# Patient Record
Sex: FEMALE | Race: WHITE | Employment: UNEMPLOYED | URBAN - METROPOLITAN AREA
[De-identification: names, ages, dates, MRNs, and addresses within clinical notes are randomized per-mention and may not be internally consistent; named-entity substitution may affect disease eponyms.]

---

## 2017-10-19 ENCOUNTER — OFFICE VISIT (OUTPATIENT)
Dept: NEUROLOGY | Age: 42
End: 2017-10-19

## 2017-10-19 VITALS — HEART RATE: 72 BPM | OXYGEN SATURATION: 98 % | SYSTOLIC BLOOD PRESSURE: 140 MMHG | DIASTOLIC BLOOD PRESSURE: 100 MMHG

## 2017-10-19 DIAGNOSIS — R20.0 NUMBNESS IN FEET: ICD-10-CM

## 2017-10-19 DIAGNOSIS — R20.0 NUMBNESS IN BOTH HANDS: Primary | ICD-10-CM

## 2017-10-19 DIAGNOSIS — G43.009 MIGRAINE WITHOUT AURA AND WITHOUT STATUS MIGRAINOSUS, NOT INTRACTABLE: ICD-10-CM

## 2017-10-19 RX ORDER — ONDANSETRON HYDROCHLORIDE 8 MG/1
TABLET, FILM COATED ORAL
Refills: 3 | COMMUNITY
Start: 2017-10-08

## 2017-10-19 RX ORDER — CLONAZEPAM 0.5 MG/1
TABLET ORAL
Refills: 3 | COMMUNITY
Start: 2017-10-05

## 2017-10-19 RX ORDER — TIZANIDINE 4 MG/1
TABLET ORAL
Refills: 1 | COMMUNITY
Start: 2017-10-09

## 2017-10-19 RX ORDER — ELETRIPTAN HYDROBROMIDE 40 MG/1
TABLET, FILM COATED ORAL
Refills: 3 | COMMUNITY
Start: 2017-10-13

## 2017-10-19 RX ORDER — METHOTREXATE 2.5 MG/1
TABLET ORAL
Refills: 0 | COMMUNITY
Start: 2017-07-16

## 2017-10-19 RX ORDER — RIZATRIPTAN BENZOATE 10 MG/1
TABLET ORAL
Refills: 6 | COMMUNITY
Start: 2017-09-30

## 2017-10-19 RX ORDER — PAROXETINE 30 MG/1
TABLET, FILM COATED ORAL
Refills: 3 | COMMUNITY
Start: 2017-09-20

## 2017-10-19 RX ORDER — TOPIRAMATE 50 MG/1
TABLET, FILM COATED ORAL
Refills: 6 | COMMUNITY
Start: 2017-09-29

## 2017-10-19 NOTE — LETTER
Dear Vale Lambert MD, Thank you for allowing me to see your patient, Abdirahman Ashby for a neurological consultation. Please see my impression and recommendations as outlined in my note. Sincerely, Parmjit Ventura MD 
[facility] at [address] [facility] BY: 
Vale Lambert MD 
 
CHIEF COMPLAINT: 
Migraines, numbness, tingling HISTORY OF PRESENT ILLNESS HISTORY PROVIDED BY: 
Patient Abdirahman sAhby is a 40 y.o. female who I am asked to see in consultation for migraines, numbness, tingling. Patient reports that she started with migraines many years ago. They will come and go but now become much more consistent and intense. Starting in the spring she developed numbness and tingling in the hands and feet bilaterally. There is also a burning sensation. Initially this was also intermittent but has now become almost constant. She has had neuropathy labs that have been normal. 
She did start a new medication for her rheumatoid arthritis called Symponi which she started in the spring. She does not recall having these symptoms prior to that. Patient reports that since she has been waiting for this appointment some of her sensation has improved. Now she will does have intermittent burning pain. Almost like her pants feet are asleep need to wake up. She did try a low dose of Lyrica but it caused her to have weight gain so she went off of this. She reports that she was started on Topamax for her headaches and since then has had no appetite and some nausea so she has lost weight. Patient was overall her energy is very low. In terms of migraine patient reports they are mostly left-sided. She has associated nausea and photo/phonophobia. She has had a few migraines per week. She takes Zofran and Relpax or Maxalt for them. She does feel like the Topamax is helping those decrease in frequency. She also has a sensation of pruritus all over. She also has a slight rash on her back. She has not had any neuroimaging. She did have a cervical spine x-ray that showed some degenerative changes. Patient has had a hysterectomy Patient has no family history of any neurological problems that she is aware. Patient reports her thyroid is within normal limits. Patient reports that her previous abortive Imitrex was a very bad for her and gave her terrible side effects. Patient denies any vision changes. Patient does have some issues with balance especially over the summer but currently that is stable. She is on vitamin supplements. [facility] Past Medical History:  
Diagnosis Date  Headache  Rheumatoid arthritis ([facility] [address] Social History Social History  Marital status:  Spouse name: N/A  
 Number of children: N/A  
 Years of education: N/A Social History Main Topics  Smoking status: Not on file  Smokeless tobacco: Not on file  Alcohol use Not on file  Drug use: Not on file  Sexual activity: Not on file Other Topics Concern  Not on file Social History Narrative [facility] No significant family history ALLERGIES No Known Allergies CURRENT MEDS Sympo methotrexate Tizanidine Clonazepam 
Paroxetine Topiramate 100 mg twice daily Maxalt Relpax Zofran REVIEW OF SYSTEMS:  
 
Y  N       Y  N  Y  N   Y  N 
  AIDS            Falls    Memory Loss     Shortness of breath Anxiety            Fatigue   Muscle Pain           Skipped beats Chest Pain     Frequent HA   Ms Weakness        Snoring Constipation  Hearing loss   Nause/Vomiting     Stomach Pain Cough           Hepatitis   Neuropathy            Swallowing difficulty Depression   Incontinence   Poor appetite         Vertigo Diarrhea         Joint Pain   Rash                      Visual disturbances Fainting          Leg Swelling   Ringing ears          Weight changes Unable to obtain  ROS due to  mental status change  sedated   intubated PREVIOUS WORKUP IMAGING: None LABS Results for orders placed or performed during the hospital encounter of 05/11/15 HCG URINE, QL. - POC Result Value Ref Range Pregnancy test,urine (POC) NEGATIVE  NEG    
TSH within normal limits PHYSICAL EXAM 
Visit Vitals  BP (!) 140/100  Pulse 72  SpO2 98% General:  Alert, cooperative, no distress. Head:  Normocephalic, without obvious abnormality, atraumatic. Eyes:  Conjunctivae/corneas clear. Pupils equal, round, reactive to light. Extraocular movements intact, VFF, NO papilledema Lungs: 
Heart:   Non labored breathing Regular rate and rhythm, no carotid bruits Abdomen:   Soft, non-distended Extremities: Extremities normal, atraumatic, no cyanosis or edema. Pulses: 2+ and symmetric all extremities. Skin: Skin color, texture, turgor normal. No rashes or lesions. Neurologic:  Gen: Attention normal 
           Language: naming, repetition, fluency normal 
           Memory: intact recent and remote memory Cranial Nerves: 
I: smell Not tested II: visual fields Full to confrontation II: pupils Equal, round, reactive to light II: optic disc No papilledema III,VII: ptosis none III,IV,VI: extraocular muscles  Full ROM V: mastication normal  
V: facial light touch sensation  normal  
VII: facial muscle function   symmetric VIII: hearing symmetric IX: soft palate elevation  normal  
XI: trapezius strength  5/5 XI: sternocleidomastoid strength 5/5 XI: neck flexion strength  5/5 XII: tongue  midline Motor: normal bulk and tone, no tremor Strength: 5/5 all four extremities Sensory: Decreased pinprick in a stocking glove distribution Coordination: FTN intact, Rhomberg negative Gait: normal gait including tandem Reflexes: 2+ throughout IMPRESSION 
 Merline Ponds is a 40 y.o. female who presents for evaluation of numbness and tingling in the hands and feet. I am concerned she has developed neuropathy. The question is that this may be related to her new medication for rheumatoid arthritis. Will do EMG/NCS to evaluate. Also discussed migraines in detail. Will continue Topamax for now but may need to consider different preventative in the future. RECOMMENDATIONS 1.  EMG/NCS of right arm and leg 2. Will get record of patient's previous labs and or any other neuropathy labs that need to be done 3. Continue vitamin supplements 4. Migraine book given 5. Preventative with Topamax 100 mg twice daily 6. Abortive with Relpax/Maxalt and Zofran 7. Discussed other neuropathic pain meds such as gabapentin if needed. Patient will wait on this for now 8. May need further neuro imaging if above workup is negative FU EMG/NCS Sabino Cabrales MD 
 
CC: Dior Rodriguez MD 
Fax: [phone] This note was created using voice recognition software. Despite editing, there may be syntax errors. This note will not be viewable in [address]

## 2017-10-19 NOTE — PROGRESS NOTES
NEUROLOGY NEW PATIENT CONSULTATION    REFERRED BY:  Vale Lambert MD    CHIEF COMPLAINT:  Migraines, numbness, tingling    HISTORY OF PRESENT ILLNESS    HISTORY PROVIDED BY:  Patient      Abdirahman Ashby is a 40 y.o. female who I am asked to see in consultation for migraines, numbness, tingling. Patient reports that she started with migraines many years ago. They will come and go but now become much more consistent and intense. Starting in the spring she developed numbness and tingling in the hands and feet bilaterally. There is also a burning sensation. Initially this was also intermittent but has now become almost constant. She has had neuropathy labs that have been normal.  She did start a new medication for her rheumatoid arthritis called Symponi which she started in the spring. She does not recall having these symptoms prior to that. Patient reports that since she has been waiting for this appointment some of her sensation has improved. Now she will does have intermittent burning pain. Almost like her pants feet are asleep need to wake up. She did try a low dose of Lyrica but it caused her to have weight gain so she went off of this. She reports that she was started on Topamax for her headaches and since then has had no appetite and some nausea so she has lost weight. Patient was overall her energy is very low. In terms of migraine patient reports they are mostly left-sided. She has associated nausea and photo/phonophobia. She has had a few migraines per week. She takes Zofran and Relpax or Maxalt for them. She does feel like the Topamax is helping those decrease in frequency. She also has a sensation of pruritus all over. She also has a slight rash on her back. She has not had any neuroimaging. She did have a cervical spine x-ray that showed some degenerative changes. Patient has had a hysterectomy  Patient has no family history of any neurological problems that she is aware.   Patient reports her thyroid is within normal limits. Patient reports that her previous abortive Imitrex was a very bad for her and gave her terrible side effects. Patient denies any vision changes. Patient does have some issues with balance especially over the summer but currently that is stable. She is on vitamin supplements.         PMH  Past Medical History:   Diagnosis Date    Headache     Rheumatoid arthritis ([address]        SH  Social History     Social History    Marital status:      Spouse name: N/A    Number of children: N/A    Years of education: N/A     Social History Main Topics    Smoking status: Not on file    Smokeless tobacco: Not on file    Alcohol use Not on file    Drug use: Not on file    Sexual activity: Not on file     Other Topics Concern    Not on file     Social History Narrative       FH  No significant family history    ALLERGIES  No Known Allergies    CURRENT MEDS  Sympo methotrexate  Tizanidine  Clonazepam  Paroxetine  Topiramate 100 mg twice daily  Maxalt  Relpax  Zofran      REVIEW OF SYSTEMS:     Y  N       Y  N  Y  N   Y  N  [] [x] AIDS          [x] [] Falls  [] [x] Memory Loss  [] [x]  Shortness of breath  [x] [] Anxiety          [x] [] Fatigue [x] [] Muscle Pain        [] [x]  Skipped beats  [] [x] Chest Pain   [x] [] Frequent HA [x] [] Ms Weakness     [] [x]  Snoring  [x] [] Constipation [] [x]Hearing loss [x] [] Nause/Vomiting  [x] []  Stomach Pain  [] [x] Cough          [] [x]Hepatitis [x] [] Neuropathy         [] [x]  Swallowing difficulty  [] [x] Depression  [x] []Incontinence [x] [] Poor appetite      [] [x]  Vertigo  [x] [] Diarrhea       [x] [] Joint Pain [x] [] Rash                   [] [x]  Visual disturbances  [] [x] Fainting        [x] [] Leg Swelling [] [x] Ringing ears       [x] []  Weight changes      []Unable to obtain  ROS due to  []mental status change  []sedated   []intubated          PREVIOUS WORKUP  IMAGING: None      LABS  Results for orders placed or performed during the hospital encounter of 05/11/15   HCG URINE, QL. - POC   Result Value Ref Range    Pregnancy test,urine (POC) NEGATIVE  NEG     TSH within normal limits    PHYSICAL EXAM  Visit Vitals    BP (!) 140/100    Pulse 72    SpO2 98%     General:  Alert, cooperative, no distress. Head:  Normocephalic, without obvious abnormality, atraumatic. Eyes:  Conjunctivae/corneas clear. Pupils equal, round, reactive to light. Extraocular movements intact, VFF, NO papilledema   Lungs:  Heart:   Non labored breathing  Regular rate and rhythm, no carotid bruits   Abdomen:   Soft, non-distended   Extremities: Extremities normal, atraumatic, no cyanosis or edema. Pulses: 2+ and symmetric all extremities. Skin: Skin color, texture, turgor normal. No rashes or lesions. Neurologic:  Gen: Attention normal             Language: naming, repetition, fluency normal             Memory: intact recent and remote memory  Cranial Nerves:  I: smell Not tested   II: visual fields Full to confrontation   II: pupils Equal, round, reactive to light   II: optic disc No papilledema   III,VII: ptosis none   III,IV,VI: extraocular muscles  Full ROM   V: mastication normal   V: facial light touch sensation  normal   VII: facial muscle function   symmetric   VIII: hearing symmetric   IX: soft palate elevation  normal   XI: trapezius strength  5/5   XI: sternocleidomastoid strength 5/5   XI: neck flexion strength  5/5   XII: tongue  midline     Motor: normal bulk and tone, no tremor              Strength: 5/5 all four extremities  Sensory: Decreased pinprick in a stocking glove distribution  Coordination: FTN intact, Rhomberg negative  Gait: normal gait including tandem   Reflexes: 2+ throughout       IMPRESSION  Donna Paul is a 40 y.o. female who presents for evaluation of numbness and tingling in the hands and feet. I am concerned she has developed neuropathy.   The question is that this may be related to her new medication for rheumatoid arthritis. Will do EMG/NCS to evaluate. Also discussed migraines in detail. Will continue Topamax for now but may need to consider different preventative in the future. RECOMMENDATIONS  1.  EMG/NCS of right arm and leg  2. Will get record of patient's previous labs and or any other neuropathy labs that need to be done  3. Continue vitamin supplements  4. Migraine book given  5. Preventative with Topamax 100 mg twice daily  6. Abortive with Relpax/Maxalt and Zofran  7. Discussed other neuropathic pain meds such as gabapentin if needed. Patient will wait on this for now  8. May need further neuro imaging if above workup is negative    FU EMG/NCS  Nora Carreon MD    CC: Robert Jaramillo MD  Fax: [phone]    This note was created using voice recognition software. Despite editing, there may be syntax errors. This note will not be viewable in [address]

## 2017-10-19 NOTE — MR AVS SNAPSHOT
Visit Information Date & Time Provider Department Dept. Phone Encounter #  
 10/19/2017 10:00 AM Ashkan Landry MD [facility] Neurology [facility] [phone] [id number] Upcoming Health Maintenance Date Due DTaP/Tdap/Td series (1 - Tdap) 1/9/1997 PAP AKA CERVICAL CYTOLOGY 1/9/1997 INFLUENZA AGE 9 TO ADULT 8/1/2017 Allergies as of 10/19/2017  Review Complete On: 10/19/2017 By: Michoacano Thakkar No Known Allergies Current Immunizations  Never Reviewed No immunizations on file. Not reviewed this visit You Were Diagnosed With   
  
 Codes Comments Numbness in both hands    -  Primary ICD-10-CM: R20.0 ICD-9-CM: 782.0 Numbness in feet     ICD-10-CM: R20.0 ICD-9-CM: 973. 0 Vitals OB Status Smoking Status Having regular periods Never Assessed Your Updated Medication List  
  
   
This list is accurate as of: 10/19/17 11:05 AM.  Always use your most recent med list.  
  
  
  
  
 clonazePAM 0.5 mg tablet Commonly known as:  Sosa Aw TK 1 TO 2 T PO QHS UTD  
  
 eletriptan 40 mg tablet Commonly known as:  RELPAX TAKE 1 TABLET BY MOUTH AT THE ONSET OF HEADACHE, MAY REPEAT WITH 1 TAB IN 2 HOURS IF NEEDED  
  
 methotrexate 2.5 mg tablet Commonly known as:  RHEUMATREX  
TAKE 10 TABLETS BY MOUTH ONCE PER WEEK  
  
 ondansetron hcl 8 mg tablet Commonly known as:  ZOFRAN  
TAKE 1 TABLET BY MOUTH EVERY 8 HOURS AS NEEDED FOR NAUSEA PARoxetine 30 mg tablet Commonly known as:  PAXIL TAKE 1 TABLET BY MOUTH EVERY MORNING  
  
 rizatriptan 10 mg tablet Commonly known as:  Magali Rast TAKE 1 TABLET BY MOUTH AS NEEDED FOR MIGRAINE, MAY REPEAT DOSE ONCE WITHIN 24 HOURS IF NEEDED  
  
 tiZANidine 4 mg tablet Commonly known as:  Mindi Fulling TAKE 1 TABLET AT BEDTIME (ALTERNATE WITH CYCLOBENZAPRINE)  
  
 topiramate 50 mg tablet Commonly known as:  TOPAMAX TAKE 1-2 TABLET TWICE A DAY To-Do List   
 10/20/2017 Neurology:  [facility] Introducing [facility]! [facility] introduces [employer] patient portal. Now you can access parts of your medical record, email your doctor's office, and request medication refills online. 1. In your internet browser, go to [email or web address] 2. Click on the First Time User? Click Here link in the Sign In box. You will see the New Member Sign Up page. 3. Enter your M[employer]oplex Access Code exactly as it appears below. You will not need to use this code after youve completed the sign-up process. If you do not sign up before the expiration date, you must request a new code. · M[employer]oplex Access Code: [email or web address] Expires: 1/17/2018 10:00 AM 
 
4. Enter the last four digits of your Social Security Number (xxxx) and Date of Birth (mm/dd/yyyy) as indicated and click Submit. You will be taken to the next sign-up page. 5. Create a M[employer]oplex ID. This will be your M[employer]oplex login ID and cannot be changed, so think of one that is secure and easy to remember. 6. Create a M[employer]x password. You can change your password at any time. 7. Enter your Password Reset Question and Answer. This can be used at a later time if you forget your password. 8. Enter your e-mail address. You will receive e-mail notification when new information is available in [address] 9. Click Sign Up. You can now view and download portions of your medical record. 10. Click the Download Summary menu link to download a portable copy of your medical information. If you have questions, please visit the Frequently Asked Questions section of the M[employer] website. Remember, M[employer]oplex is NOT to be used for urgent needs. For medical emergencies, dial 911. Now available from your iPhone and Android! Please provide this summary of care documentation to your next provider. Your primary care clinician is listed as Kathy Ward.  If you have any questions after today's visit, please call [phone].

## 2017-10-24 ENCOUNTER — TELEPHONE (OUTPATIENT)
Dept: NEUROLOGY | Age: 42
End: 2017-10-24

## 2017-10-26 ENCOUNTER — OFFICE VISIT (OUTPATIENT)
Dept: NEUROLOGY | Age: 42
End: 2017-10-26

## 2017-10-26 ENCOUNTER — TELEPHONE (OUTPATIENT)
Dept: NEUROLOGY | Age: 42
End: 2017-10-26

## 2017-10-26 DIAGNOSIS — R20.2 PARESTHESIA: Primary | ICD-10-CM

## 2017-10-26 NOTE — TELEPHONE ENCOUNTER
Contacted this physicians office and informed them we do not have a release nor a referral that I can see in the patients chart regarding this patient. I informed her that I would contact patient to request she sign a release, the lady said she would just fax the release herself. I informed her I cannot provide patients name and she states she already knows who it is and will send the fax. I informed her if she is confident she has the correct patient she may fax the release. Fax number provided.

## 2017-10-26 NOTE — TELEPHONE ENCOUNTER
[facility] needs last notes fax [phone]

## 2017-11-01 ENCOUNTER — OFFICE VISIT (OUTPATIENT)
Dept: NEUROLOGY | Age: 42
End: 2017-11-01

## 2017-11-01 VITALS — HEART RATE: 78 BPM | SYSTOLIC BLOOD PRESSURE: 153 MMHG | DIASTOLIC BLOOD PRESSURE: 92 MMHG

## 2017-11-01 DIAGNOSIS — R20.0 NUMBNESS IN FEET: ICD-10-CM

## 2017-11-01 DIAGNOSIS — G43.009 MIGRAINE WITHOUT AURA AND WITHOUT STATUS MIGRAINOSUS, NOT INTRACTABLE: ICD-10-CM

## 2017-11-01 DIAGNOSIS — R20.0 NUMBNESS IN BOTH HANDS: Primary | ICD-10-CM

## 2017-11-01 RX ORDER — GABAPENTIN 100 MG/1
300 CAPSULE ORAL
Qty: 90 CAP | Refills: 5 | Status: SHIPPED | OUTPATIENT
Start: 2017-11-01 | End: 2018-06-04 | Stop reason: SDUPTHER

## 2017-11-01 NOTE — MR AVS SNAPSHOT
Visit Information Date & Time Provider Department Dept. Phone Encounter #  
 11/1/2017  9:40 AM Sebastian Gary MD [facility] Neurology [facility] [phone] [id number] Upcoming Health Maintenance Date Due DTaP/Tdap/Td series (1 - Tdap) 1/9/1997 PAP AKA CERVICAL CYTOLOGY 1/9/1997 INFLUENZA AGE 9 TO ADULT 8/1/2017 Allergies as of 11/1/2017  Review Complete On: 11/1/2017 By: Sebastian Gary MD  
 No Known Allergies Current Immunizations  Never Reviewed No immunizations on file. Not reviewed this visit You Were Diagnosed With   
  
 Codes Comments Numbness in both hands    -  Primary ICD-10-CM: R20.0 ICD-9-CM: 782.0 Numbness in feet     ICD-10-CM: R20.0 ICD-9-CM: 782.0 Migraine without aura and without status migrainosus, not intractable     ICD-10-CM: M39.066 ICD-9-CM: 346.10 Vitals BP Pulse OB Status Smoking Status (!) 153/92 78 Having regular periods Never Smoker Preferred Pharmacy Pharmacy Name Phone [id number] Shelby Iglesias [address] [place] [phone] Your Updated Medication List  
  
   
This list is accurate as of: 11/1/17 10:12 AM.  Always use your most recent med list.  
  
  
  
  
 clonazePAM 0.5 mg tablet Commonly known as:  Alexandra Greene TK 1 TO 2 T PO QHS UTD  
  
 eletriptan 40 mg tablet Commonly known as:  RELPAX TAKE 1 TABLET BY MOUTH AT THE ONSET OF HEADACHE, MAY REPEAT WITH 1 TAB IN 2 HOURS IF NEEDED  
  
 gabapentin 100 mg capsule Commonly known as:  NEURONTIN Take 3 Caps by mouth nightly. methotrexate 2.5 mg tablet Commonly known as:  RHEUMATREX  
TAKE 10 TABLETS BY MOUTH ONCE PER WEEK  
  
 ondansetron hcl 8 mg tablet Commonly known as:  ZOFRAN  
TAKE 1 TABLET BY MOUTH EVERY 8 HOURS AS NEEDED FOR NAUSEA PARoxetine 30 mg tablet Commonly known as:  PAXIL TAKE 1 TABLET BY MOUTH EVERY MORNING  
  
 rizatriptan 10 mg tablet Commonly known as:  Andrzej Pierre TAKE 1 TABLET BY MOUTH AS NEEDED FOR MIGRAINE, MAY REPEAT DOSE ONCE WITHIN 24 HOURS IF NEEDED  
  
 tiZANidine 4 mg tablet Commonly known as:  Latasha Reagin TAKE 1 TABLET AT BEDTIME (ALTERNATE WITH CYCLOBENZAPRINE) * topiramate 50 mg tablet Commonly known as:  TOPAMAX TAKE 1-2 TABLET TWICE A DAY * topiramate  mg capsule Commonly known as:  TROKENDI XR Take 1 Cap by mouth daily. * Notice: This list has 2 medication(s) that are the same as other medications prescribed for you. Read the directions carefully, and ask your doctor or other care provider to review them with you. Prescriptions Sent to Pharmacy Refills  
 gabapentin (NEURONTIN) 100 mg capsule 5 Sig: Take 3 Caps by mouth nightly. Class: Normal  
 Pharmacy: [facility] [address] [place] [address] #: [phone] Route: Oral  
  
Patient Instructions Benjamin Giordano [id number] What is a living will? A living will is a legal form you use to write down the kind of care you want at the end of your life. It is used by the health professionals who will treat you if you aren't able to decide for yourself. If you put your wishes in writing, your loved ones and others will know what kind of care you want. They won't need to guess. This can ease your mind and be helpful to others. A living will is not the same as an estate or property will. An estate will explains what you want to happen with your money and property after you die. Is a living will a legal document? A living will is a legal document. Each state has its own laws about living giles. If you move to another state, make sure that your living will is legal in the state where you now live. Or you might use a universal form that has been approved by many states. This kind of form can sometimes be completed and stored online.  Your electronic copy will then be available wherever you have a connection to the Internet. In most cases, doctors will respect your wishes even if you have a form from a different state. · You don't need an  to complete a living will. But legal advice can be helpful if your state's laws are unclear, your health history is complicated, or your family can't agree on what should be in your living will. · You can change your living will at any time. Some people find that their wishes about end-of-life care change as their health changes. · In addition to making a living will, think about completing a medical power of  form. This form lets you name the person you want to make end-of-life treatment decisions for you (your \"health care agent\") if you're not able to. Many hospitals and nursing homes will give you the forms you need to complete a living will and a medical power of . · Your living will is used only if you can't make or communicate decisions for yourself anymore. If you become able to make decisions again, you can accept or refuse any treatment, no matter what you wrote in your living will. · Your state may offer an online registry. This is a place where you can store your living will online so the doctors and nurses who need to treat you can find it right away. What should you think about when creating a living will? Talk about your end-of-life wishes with your family members and your doctor. Let them know what you want. That way the people making decisions for you won't be surprised by your choices. Think about these questions as you make your living will: · Do you know enough about life support methods that might be used? If not, talk to your doctor so you know what might be done if you can't breathe on your own, your heart stops, or you're unable to swallow.  
· What things would you still want to be able to do after you receive life-support methods? Would you want to be able to walk? To speak? To eat on your own? To live without the help of machines? · If you have a choice, where do you want to be cared for? In your home? At a hospital or nursing home? · Do you want certain Methodist practices performed if you become very ill? · If you have a choice at the end of your life, where would you prefer to die? At home? In a hospital or nursing home? Somewhere else? · Would you prefer to be buried or cremated? · Do you want your organs to be donated after you die? What should you do with your living will? · Make sure that your family members and your health care agent have copies of your living will. · Give your doctor a copy of your living will to keep in your medical record. If you have more than one doctor, make sure that each one has a copy. · You may want to put a copy of your living will where it can be easily found. Where can you learn more? Go to http://marifer-alonzo.info/. Enter F067 in the search box to learn more about \"Learning About Living Michael Felt. \" Current as of: September 24, 2016 Content Version: 11.4 © 5830-3976 Deck[employer]Q. Care instructions adapted under license by [employer] (which disclaims liability or warranty for this information). If you have questions about a medical condition or this instruction, always ask your healthcare professional. [place] [address] any warranty or liability for your use of this information. Advance Directives: Care Instructions Your Care Instructions An advance directive is a legal way to state your wishes at the end of your life. It tells your family and your doctor what to do if you can no longer say what you want. There are two main types of advance directives. You can change them any time that your wishes change.  
· A living will tells your family and your doctor your wishes about life support and other treatment. · A durable power of  for health care lets you name a person to make treatment decisions for you when you can't speak for yourself. This person is called a health care agent. If you do not have an advance directive, decisions about your medical care may be made by a doctor or a  who doesn't know you. It may help to think of an advance directive as a gift to the people who care for you. If you have one, they won't have to make tough decisions by themselves. Follow-up care is a key part of your treatment and safety. Be sure to make and go to all appointments, and call your doctor if you are having problems. It's also a good idea to know your test results and keep a list of the medicines you take. How can you care for yourself at home? · Discuss your wishes with your loved ones and your doctor. This way, there are no surprises. · Many states have a unique form. Or you might use a universal form that has been approved by many states. This kind of form can sometimes be completed and stored online. Your electronic copy will then be available wherever you have a connection to the Internet. In most cases, doctors will respect your wishes even if you have a form from a different state. · You don't need a  to do an advance directive. But you may want to get legal advice. · Think about these questions when you prepare an advance directive: ¨ Who do you want to make decisions about your medical care if you are not able to? Many people choose a family member or close friend. ¨ Do you know enough about life support methods that might be used? If not, talk to your doctor so you understand. ¨ What are you most afraid of that might happen? You might be afraid of having pain, losing your independence, or being kept alive by machines. ¨ Where would you prefer to die? Choices include your home, a hospital, or a nursing home. ¨ Would you like to have information about hospice care to support you and your family? ¨ Do you want to donate organs when you die? ¨ Do you want certain Synagogue practices performed before you die? If so, put your wishes in the advance directive. · Read your advance directive every year, and make changes as needed. When should you call for help? Be sure to contact your doctor if you have any questions. Where can you learn more? Go to http://marifer-alonzo.info/. Enter R264 in the search box to learn more about \"Advance Directives: Care Instructions. \" Current as of: September 24, 2016 Content Version: 11.4 © 4728-5841 Jingle Networks. Care instructions adapted under license by [employer] (which disclaims liability or warranty for this information). If you have questions about a medical condition or this instruction, always ask your healthcare professional. [place] [address] any warranty or liability for your use of this information. PRESCRIPTION REFILL POLICY [facility] Statement to Patients April 1, 2014 In an effort to ensure the large volume of patient prescription refills is processed in the most efficient and expeditious manner, we are asking our patients to assist us by calling your Pharmacy for all prescription refills, this will include also your  Mail Order Pharmacy. The pharmacy will contact our office electronically to continue the refill process. Please do not wait until the last minute to call your pharmacy. We need at least 48 hours (2days) to fill prescriptions. We also encourage you to call your pharmacy before going to  your prescription to make sure it is ready.   
 
With regard to controlled substance prescription refill requests (narcotic refills) that need to be picked up at our office, we ask your cooperation by providing us with at least 72 hours (3days) notice that you will need a refill. We will not refill narcotic prescription refill requests after 4:00pm on any weekday, Monday through Thursday, or after 2:00pm on Fridays, or on the weekends. We encourage everyone to explore another way of getting your prescription refill request processed using InSphero, our patient web portal through our electronic medical record system. InSphero is an efficient and effective way to communicate your medication request directly to the office and  downloadable as an carolina on your smart phone . InSphero also features a review functionality that allows you to view your medication list as well as leave messages for your physician. Are you ready to get connected? If so please review the attatched instructions or speak to any of our staff to get you set up right away! Thank you so much for your cooperation. Should you have any questions please contact our Practice Administrator. The Physicians and Staff,  [facility] Introducing [facility] SERVICES! [facility] introduces InSphero patient portal. Now you can access parts of your medical record, email your doctor's office, and request medication refills online. 1. In your internet browser, go to [email or web address] 2. Click on the First Time User? Click Here link in the Sign In box. You will see the New Member Sign Up page. 3. Enter your InSphero Access Code exactly as it appears below. You will not need to use this code after youve completed the sign-up process. If you do not sign up before the expiration date, you must request a new code. · InSphero Access Code: [email or web address] Expires: 1/17/2018 10:00 AM 
 
4. Enter the last four digits of your Social Security Number (xxxx) and Date of Birth (mm/dd/yyyy) as indicated and click Submit. You will be taken to the next sign-up page. 5. Create a InSphero ID.  This will be your InSphero login ID and cannot be changed, so think of one that is secure and easy to remember. 6. Create a [facility] password. You can change your password at any time. 7. Enter your Password Reset Question and Answer. This can be used at a later time if you forget your password. 8. Enter your e-mail address. You will receive e-mail notification when new information is available in [address] 9. Click Sign Up. You can now view and download portions of your medical record. 10. Click the Download Summary menu link to download a portable copy of your medical information. If you have questions, please visit the Frequently Asked Questions section of the [facility] website. Remember, [facility] is NOT to be used for urgent needs. For medical emergencies, dial 911. Now available from your iPhone and Android! Please provide this summary of care documentation to your next provider. Your primary care clinician is listed as NOT ON FILE. If you have any questions after today's visit, please call [phone].

## 2017-11-01 NOTE — PATIENT INSTRUCTIONS
Learning About Living Macario  What is a living will? A living will is a legal form you use to write down the kind of care you want at the end of your life. It is used by the health professionals who will treat you if you aren't able to decide for yourself. If you put your wishes in writing, your loved ones and others will know what kind of care you want. They won't need to guess. This can ease your mind and be helpful to others. A living will is not the same as an estate or property will. An estate will explains what you want to happen with your money and property after you die. Is a living will a legal document? A living will is a legal document. Each state has its own laws about living giles. If you move to another state, make sure that your living will is legal in the state where you now live. Or you might use a universal form that has been approved by many states. This kind of form can sometimes be completed and stored online. Your electronic copy will then be available wherever you have a connection to the Internet. In most cases, doctors will respect your wishes even if you have a form from a different state. · You don't need an  to complete a living will. But legal advice can be helpful if your state's laws are unclear, your health history is complicated, or your family can't agree on what should be in your living will. · You can change your living will at any time. Some people find that their wishes about end-of-life care change as their health changes. · In addition to making a living will, think about completing a medical power of  form. This form lets you name the person you want to make end-of-life treatment decisions for you (your \"health care agent\") if you're not able to. Many hospitals and nursing homes will give you the forms you need to complete a living will and a medical power of .   · Your living will is used only if you can't make or communicate decisions for yourself anymore. If you become able to make decisions again, you can accept or refuse any treatment, no matter what you wrote in your living will. · Your state may offer an online registry. This is a place where you can store your living will online so the doctors and nurses who need to treat you can find it right away. What should you think about when creating a living will? Talk about your end-of-life wishes with your family members and your doctor. Let them know what you want. That way the people making decisions for you won't be surprised by your choices. Think about these questions as you make your living will:  · Do you know enough about life support methods that might be used? If not, talk to your doctor so you know what might be done if you can't breathe on your own, your heart stops, or you're unable to swallow. · What things would you still want to be able to do after you receive life-support methods? Would you want to be able to walk? To speak? To eat on your own? To live without the help of machines? · If you have a choice, where do you want to be cared for? In your home? At a hospital or nursing home? · Do you want certain Anabaptist practices performed if you become very ill? · If you have a choice at the end of your life, where would you prefer to die? At home? In a hospital or nursing home? Somewhere else? · Would you prefer to be buried or cremated? · Do you want your organs to be donated after you die? What should you do with your living will? · Make sure that your family members and your health care agent have copies of your living will. · Give your doctor a copy of your living will to keep in your medical record. If you have more than one doctor, make sure that each one has a copy. · You may want to put a copy of your living will where it can be easily found. Where can you learn more? Go to http://marifer-alonzo.info/.   Enter Z478 in the search box to learn electronic copy will then be available wherever you have a connection to the Internet. In most cases, doctors will respect your wishes even if you have a form from a different state. · You don't need a  to do an advance directive. But you may want to get legal advice. · Think about these questions when you prepare an advance directive:  ¨ Who do you want to make decisions about your medical care if you are not able to? Many people choose a family member or close friend. ¨ Do you know enough about life support methods that might be used? If not, talk to your doctor so you understand. ¨ What are you most afraid of that might happen? You might be afraid of having pain, losing your independence, or being kept alive by machines. ¨ Where would you prefer to die? Choices include your home, a hospital, or a nursing home. ¨ Would you like to have information about hospice care to support you and your family? ¨ Do you want to donate organs when you die? ¨ Do you want certain Evangelical practices performed before you die? If so, put your wishes in the advance directive. · Read your advance directive every year, and make changes as needed. When should you call for help? Be sure to contact your doctor if you have any questions. Where can you learn more? Go to http://marifer-alonzo.info/. Enter R264 in the search box to learn more about \"Advance Directives: Care Instructions. \"  Current as of: September 24, 2016  Content Version: 11.4  © 6646-0021 TasteSpace. Care instructions adapted under license by [employer] (which disclaims liability or warranty for this information). If you have questions about a medical condition or this instruction, always ask your healthcare professional. [place] [address] any warranty or liability for your use of this information.   [facility]   Statement to Patients  April 1, 2014      In an effort to ensure the large volume of patient prescription refills is processed in the most efficient and expeditious manner, we are asking our patients to assist us by calling your Pharmacy for all prescription refills, this will include also your  Mail Order Pharmacy. The pharmacy will contact our office electronically to continue the refill process. Please do not wait until the last minute to call your pharmacy. We need at least 48 hours (2days) to fill prescriptions. We also encourage you to call your pharmacy before going to  your prescription to make sure it is ready. With regard to controlled substance prescription refill requests (narcotic refills) that need to be picked up at our office, we ask your cooperation by providing us with at least 72 hours (3days) notice that you will need a refill. We will not refill narcotic prescription refill requests after 4:00pm on any weekday, Monday through Thursday, or after 2:00pm on Fridays, or on the weekends. We encourage everyone to explore another way of getting your prescription refill request processed using NextMedium, our patient web portal through our electronic medical record system. NextMedium is an efficient and effective way to communicate your medication request directly to the office and  downloadable as an carolina on your smart phone . NextMedium also features a review functionality that allows you to view your medication list as well as leave messages for your physician. Are you ready to get connected? If so please review the attatched instructions or speak to any of our staff to get you set up right away! Thank you so much for your cooperation. Should you have any questions please contact our Practice Administrator.     The Physicians and Staff,  [address] [place] [address]

## 2017-11-01 NOTE — PROGRESS NOTES
Neurology Progress Note    Patient ID:  Tavia Lorenzo  [id number]  24 y.o.  1975    HISTORY PROVIDED BY:  Patient      Chief Complaint: Migraines, numbness, tingling  Subjective:    Ms. Trina Graves is here for follow up today of migraines, numbness, tingling. Patient reports that she continues to have her migraines but they are under fairly good control with her Topamax. She takes Topamax 100 mg twice daily. Her only side effect is that she does have word finding difficulty with the Topamax. She has never tried any other options with extended release versions. She does do abortive with Relpax and Maxalt with some Zofran. This does help her. She still has some numbness and tingling in the hands and feet. Lab work has not been received from her PCP but will request today. She did have an EMG/NCS of her right arm and leg which was negative for any sign of neuropathy or radiculopathy. Patient reports that she did have some neck issues about 2 years ago with some signs of degenerative disc disease but has not had any updated imaging since that time. She does have some neck tightness now but no significant symptoms of this. No vision changes. No weakness.       Objective:   ROS:  Per HPI-  Otherwise 12 point ROS was negative    Meds:  Current Outpatient Prescriptions on File Prior to Visit   Medication Sig Dispense Refill    tiZANidine (ZANAFLEX) 4 mg tablet TAKE 1 TABLET AT BEDTIME (ALTERNATE WITH CYCLOBENZAPRINE)  1    clonazePAM (KLONOPIN) 0.5 mg tablet TK 1 TO 2 T PO QHS UTD  3    PARoxetine (PAXIL) 30 mg tablet TAKE 1 TABLET BY MOUTH EVERY MORNING  3    topiramate (TOPAMAX) 50 mg tablet TAKE 1-2 TABLET TWICE A DAY  6    rizatriptan (MAXALT) 10 mg tablet TAKE 1 TABLET BY MOUTH AS NEEDED FOR MIGRAINE, MAY REPEAT DOSE ONCE WITHIN 24 HOURS IF NEEDED  6    ondansetron hcl (ZOFRAN) 8 mg tablet TAKE 1 TABLET BY MOUTH EVERY 8 HOURS AS NEEDED FOR NAUSEA  3    methotrexate (RHEUMATREX) 2.5 mg tablet TAKE 10 TABLETS BY MOUTH ONCE PER WEEK  0    eletriptan (RELPAX) 40 mg tablet TAKE 1 TABLET BY MOUTH AT THE ONSET OF HEADACHE, MAY REPEAT WITH 1 TAB IN 2 HOURS IF NEEDED  3     No current facility-administered medications on file prior to visit. Imaging:  EMG/NCS of right side: Normal    Reviewed records in [facility] and media tab today    Lab Review   Results for orders placed or performed during the hospital encounter of 05/11/15   HCG URINE, QL. - POC   Result Value Ref Range    Pregnancy test,urine (POC) NEGATIVE  NEG         Exam:  Visit Vitals    BP (!) 153/92    Pulse 78     Gen: Well developed  CV: RRR  Lungs: non labored breathing  Abd: non distending  Neuro: A&O x 3, no dysarthria or aphasia  CN II-XII: PERRL, EOMI, face symmetric, tongue/palate midline  Motor: strength 5/5 all four ext  Sensory: intact to LT  Gait: normal    Assessment:   Abdirahman Ashby is a 40 y.o. female who presents for follow up of numbness and tingling in the hands and feet. I am concerned she has developed neuropathy. EMG/NCS was negative for this. However could still be small fiber neuropathy. I am waiting on labs from PCP regarding neuropathy eval. also will try Trokendi XR for patient's migraines to see if she has better benefit in terms of word finding difficulty. Plan:     1. EMG/NCS of right arm and leg normal  2. Will get record of patient's previous labs and or any other neuropathy labs that need to be donecalled patient's PCPs office and they will fax it over today  3. Continue vitamin supplements  4. Change Topamax to Trokendi  mg daily patient will try this for 2 weeks and call us if she would like to do prescription  5. Will try gabapentin 100 mg nightly for nerve pain and patient could titrate up to 300 mg. Information given to patient. Side effects discussed  6. Abortive with Relpax/Maxalt and Zofran  7.   At follow-up may consider doing an MRI of the cervical spine if patient is still having symptoms and treatment is not helping    Follow-up 3 months    Signed:  Gabrielle Camara MD  11/1/2017    This note was created using voice recognition software. Despite editing, there may be syntax errors. This note will not be viewable in [address]       PCP: Luba Byers (Partner MD) [id number] [address] [place] [address] [phone]

## 2017-11-01 NOTE — LETTER
Neurology Progress Note Patient ID: 
Neha Salinas [id number] 
40 y.o. 
1975 HISTORY PROVIDED BY: 
Patient Chief Complaint: Migraines, numbness, tingling Subjective:  
 Ms. Leonarda Soto is here for follow up today of migraines, numbness, tingling. Patient reports that she continues to have her migraines but they are under fairly good control with her Topamax. She takes Topamax 100 mg twice daily. Her only side effect is that she does have word finding difficulty with the Topamax. She has never tried any other options with extended release versions. She does do abortive with Relpax and Maxalt with some Zofran. This does help her. She still has some numbness and tingling in the hands and feet. Lab work has not been received from her PCP but will request today. She did have an EMG/NCS of her right arm and leg which was negative for any sign of neuropathy or radiculopathy. Patient reports that she did have some neck issues about 2 years ago with some signs of degenerative disc disease but has not had any updated imaging since that time. She does have some neck tightness now but no significant symptoms of this. No vision changes. No weakness. Objective:  
ROS: 
Per HPI- 
Otherwise 12 point ROS was negative Meds: 
Current Outpatient Prescriptions on File Prior to Visit Medication Sig Dispense Refill  tiZANidine (ZANAFLEX) 4 mg tablet TAKE 1 TABLET AT BEDTIME (ALTERNATE WITH CYCLOBENZAPRINE)  1  
 clonazePAM (KLONOPIN) 0.5 mg tablet TK 1 TO 2 T PO QHS UTD  3  
 PARoxetine (PAXIL) 30 mg tablet TAKE 1 TABLET BY MOUTH EVERY MORNING  3  
 topiramate (TOPAMAX) 50 mg tablet TAKE 1-2 TABLET TWICE A DAY  6  
 rizatriptan (MAXALT) 10 mg tablet TAKE 1 TABLET BY MOUTH AS NEEDED FOR MIGRAINE, MAY REPEAT DOSE ONCE WITHIN 24 HOURS IF NEEDED  6  
 ondansetron hcl (ZOFRAN) 8 mg tablet TAKE 1 TABLET BY MOUTH EVERY 8 HOURS AS NEEDED FOR NAUSEA  3  
  methotrexate (RHEUMATREX) 2.5 mg tablet TAKE 10 TABLETS BY MOUTH ONCE PER WEEK  0  
 eletriptan (RELPAX) 40 mg tablet TAKE 1 TABLET BY MOUTH AT THE ONSET OF HEADACHE, MAY REPEAT WITH 1 TAB IN 2 HOURS IF NEEDED  3 No current facility-administered medications on file prior to visit. Imaging: 
EMG/NCS of right side: Normal 
 
Reviewed records in [facility] and media tab today Lab Review Results for orders placed or performed during the hospital encounter of 05/11/15 HCG URINE, QL. - POC Result Value Ref Range Pregnancy test,urine (POC) NEGATIVE  NEG Exam: 
Visit Vitals  BP (!) 153/92  Pulse 78 Gen: Well developed CV: RRR Lungs: non labored breathing Abd: non distending Neuro: A&O x 3, no dysarthria or aphasia CN II-XII: PERRL, EOMI, face symmetric, tongue/palate midline Motor: strength 5/5 all four ext Sensory: intact to LT Gait: normal 
 
Assessment:  
Hanh Carmichael is a 40 y.o. female who presents for follow up of numbness and tingling in the hands and feet. I am concerned she has developed neuropathy. EMG/NCS was negative for this. However could still be small fiber neuropathy. I am waiting on labs from PCP regarding neuropathy eval. also will try Trokendi XR for patient's migraines to see if she has better benefit in terms of word finding difficulty. Plan: 1. EMG/NCS of right arm and leg normal 
2. Will get record of patient's previous labs and or any other neuropathy labs that need to be donecalled patient's PCPs office and they will fax it over today 3. Continue vitamin supplements 4. Change Topamax to Trokendi  mg daily patient will try this for 2 weeks and call us if she would like to do prescription 5. Will try gabapentin 100 mg nightly for nerve pain and patient could titrate up to 300 mg. Information given to patient. Side effects discussed 6. Abortive with Relpax/Maxalt and Zofran 7.  At follow-up may consider doing an MRI of the cervical spine if patient is still having symptoms and treatment is not helping Follow-up 3 months Signed: 
Veena Lemus MD 
11/1/2017 This note was created using voice recognition software. Despite editing, there may be syntax errors. This note will not be viewable in [address] PCP: Kaylyn Ye (Partner MD) [id number] [address] [place] [id number]

## 2017-11-03 ENCOUNTER — TELEPHONE (OUTPATIENT)
Dept: NEUROLOGY | Age: 42
End: 2017-11-03

## 2017-11-03 DIAGNOSIS — R20.0 NUMBNESS: Primary | ICD-10-CM

## 2017-11-20 ENCOUNTER — TELEPHONE (OUTPATIENT)
Dept: NEUROLOGY | Age: 42
End: 2017-11-20

## 2017-11-21 ENCOUNTER — TELEPHONE (OUTPATIENT)
Dept: NEUROLOGY | Age: 42
End: 2017-11-21

## 2017-11-21 NOTE — TELEPHONE ENCOUNTER
We can try that. Does she know her previous dosage? I usually start at 50mg BID and then increase if needed.

## 2017-11-21 NOTE — TELEPHONE ENCOUNTER
Patient states the gabapentin she was prescribed is not helping. She is taking 300 mg nightly. She says she has taken Lyrica in the past and that was more helpful. Please advise, thank you.

## 2017-11-21 NOTE — TELEPHONE ENCOUNTER
----- Message from Fransico iRvera sent at 11/21/2017 10:10 AM EST -----  Regarding: Dr. Barakat/ Telephone  The pt left a message yesterday for someone to call her back regarding her medications. She is hoping the callback time will be sped up because of the holiday coming up.           Best contact number is [phone]

## 2017-11-22 RX ORDER — PREGABALIN 50 MG/1
50 CAPSULE ORAL 2 TIMES DAILY
Qty: 60 CAP | Refills: 3 | Status: SHIPPED | OUTPATIENT
Start: 2017-11-22

## 2017-11-22 NOTE — TELEPHONE ENCOUNTER
Contacted patient and told her that we can send a Lyrica prescription in for her. She states she does not remember the previous dose but is okay with starting out small to start with. Prescription ordered per Dr. Melva Cardona.

## 2017-11-27 ENCOUNTER — TELEPHONE (OUTPATIENT)
Dept: NEUROLOGY | Age: 42
End: 2017-11-27

## 2017-11-27 NOTE — TELEPHONE ENCOUNTER
----- Message from Elmo Nix sent at 11/27/2017 11:54 AM EST -----  Regarding: /Telephone  Pt has questions about the medication Lyrica that she is taking. Best contact number is [phone].

## 2018-03-08 ENCOUNTER — TELEPHONE (OUTPATIENT)
Dept: NEUROLOGY | Age: 43
End: 2018-03-08

## 2018-03-08 NOTE — TELEPHONE ENCOUNTER
Mary Light from [facility] called and would like verification if this patient should be taking certain medications at the same time. She would like a call back to clarify.

## 2018-03-08 NOTE — TELEPHONE ENCOUNTER
Called [facility]   They just wanted to make sure if patient should be taking lyrica 75mg bid and gabapenitn 100mg? Script for lyrica 75 was written by a different md   Please advise      In telephone encounter saw where gabapentin wasn't helping ? So is she changed to just lyrica?

## 2018-05-04 ENCOUNTER — HOSPITAL ENCOUNTER (OUTPATIENT)
Dept: CT IMAGING | Age: 43
Discharge: HOME | End: 2018-05-04
Attending: FAMILY MEDICINE
Payer: COMMERCIAL

## 2018-05-04 DIAGNOSIS — R10.32 ABDOMINAL PAIN, LEFT LOWER QUADRANT: ICD-10-CM

## 2018-05-04 PROCEDURE — 74011636320 HC RX REV CODE- 636/320: Performed by: RADIOLOGY

## 2018-05-04 PROCEDURE — 74177 CT ABD & PELVIS W/CONTRAST: CPT

## 2018-05-04 RX ADMIN — IOPAMIDOL 98 ML: 755 INJECTION, SOLUTION INTRAVENOUS at 09:59

## 2018-05-11 ENCOUNTER — HOSPITAL ENCOUNTER (OUTPATIENT)
Dept: ULTRASOUND IMAGING | Age: 43
Discharge: HOME | End: 2018-05-11
Attending: FAMILY MEDICINE
Payer: COMMERCIAL

## 2018-05-11 DIAGNOSIS — R93.89 ABNORMAL CT SCAN: ICD-10-CM

## 2018-05-11 PROCEDURE — 76700 US EXAM ABDOM COMPLETE: CPT

## 2018-06-04 RX ORDER — GABAPENTIN 100 MG/1
300 CAPSULE ORAL
Qty: 90 CAP | Refills: 0 | Status: SHIPPED | OUTPATIENT
Start: 2018-06-04

## 2019-03-07 ENCOUNTER — OFFICE VISIT (OUTPATIENT)
Dept: SURGERY | Age: 44
End: 2019-03-07

## 2019-03-07 ENCOUNTER — HOSPITAL ENCOUNTER (EMERGENCY)
Age: 44
Discharge: HOME | End: 2019-03-07
Attending: EMERGENCY MEDICINE
Payer: COMMERCIAL

## 2019-03-07 VITALS
WEIGHT: 125 LBS | SYSTOLIC BLOOD PRESSURE: 140 MMHG | DIASTOLIC BLOOD PRESSURE: 96 MMHG | HEART RATE: 73 BPM | BODY MASS INDEX: 22.15 KG/M2 | HEIGHT: 63 IN

## 2019-03-07 VITALS
DIASTOLIC BLOOD PRESSURE: 96 MMHG | RESPIRATION RATE: 17 BRPM | OXYGEN SATURATION: 98 % | HEART RATE: 73 BPM | HEIGHT: 63 IN | BODY MASS INDEX: 22.15 KG/M2 | WEIGHT: 125 LBS | SYSTOLIC BLOOD PRESSURE: 140 MMHG | TEMPERATURE: 98.4 F

## 2019-03-07 DIAGNOSIS — N64.4 MASTODYNIA OF RIGHT BREAST: Primary | ICD-10-CM

## 2019-03-07 DIAGNOSIS — N64.4 BREAST PAIN IN FEMALE: Primary | ICD-10-CM

## 2019-03-07 DIAGNOSIS — Z98.82 S/P BREAST AUGMENTATION: ICD-10-CM

## 2019-03-07 LAB
ALBUMIN SERPL-MCNC: 3.4 G/DL (ref 3.5–5)
ALBUMIN/GLOB SERPL: 1 {RATIO} (ref 1.1–2.2)
ALP SERPL-CCNC: 55 U/L (ref 45–117)
ALT SERPL-CCNC: 24 U/L (ref 12–78)
ANION GAP SERPL CALC-SCNC: 8 MMOL/L (ref 5–15)
AST SERPL-CCNC: 22 U/L (ref 15–37)
ATRIAL RATE: 75 BPM
BASOPHILS # BLD: 0.1 K/UL (ref 0–0.1)
BASOPHILS NFR BLD: 1 % (ref 0–1)
BILIRUB SERPL-MCNC: 0.3 MG/DL (ref 0.2–1)
BUN SERPL-MCNC: 11 MG/DL (ref 6–20)
BUN/CREAT SERPL: 17 (ref 12–20)
CALCIUM SERPL-MCNC: 8.6 MG/DL (ref 8.5–10.1)
CALCULATED P AXIS, ECG09: 38 DEGREES
CALCULATED R AXIS, ECG10: 7 DEGREES
CALCULATED T AXIS, ECG11: 49 DEGREES
CHLORIDE SERPL-SCNC: 109 MMOL/L (ref 97–108)
CO2 SERPL-SCNC: 25 MMOL/L (ref 21–32)
CREAT SERPL-MCNC: 0.64 MG/DL (ref 0.55–1.02)
DIAGNOSIS, 93000: NORMAL
DIFFERENTIAL METHOD BLD: ABNORMAL
EOSINOPHIL # BLD: 0.1 K/UL (ref 0–0.4)
EOSINOPHIL NFR BLD: 3 % (ref 0–7)
ERYTHROCYTE [DISTWIDTH] IN BLOOD BY AUTOMATED COUNT: 12 % (ref 11.5–14.5)
GLOBULIN SER CALC-MCNC: 3.4 G/DL (ref 2–4)
GLUCOSE SERPL-MCNC: 81 MG/DL (ref 65–100)
HCT VFR BLD AUTO: 37.9 % (ref 35–47)
HGB BLD-MCNC: 12.7 G/DL (ref 11.5–16)
IMM GRANULOCYTES # BLD AUTO: 0 K/UL (ref 0–0.04)
IMM GRANULOCYTES NFR BLD AUTO: 0 % (ref 0–0.5)
LYMPHOCYTES # BLD: 1.5 K/UL (ref 0.8–3.5)
LYMPHOCYTES NFR BLD: 35 % (ref 12–49)
MCH RBC QN AUTO: 33.5 PG (ref 26–34)
MCHC RBC AUTO-ENTMCNC: 33.5 G/DL (ref 30–36.5)
MCV RBC AUTO: 100 FL (ref 80–99)
MONOCYTES # BLD: 0.4 K/UL (ref 0–1)
MONOCYTES NFR BLD: 8 % (ref 5–13)
NEUTS SEG # BLD: 2.3 K/UL (ref 1.8–8)
NEUTS SEG NFR BLD: 53 % (ref 32–75)
NRBC # BLD: 0 K/UL (ref 0–0.01)
NRBC BLD-RTO: 0 PER 100 WBC
P-R INTERVAL, ECG05: 124 MS
PLATELET # BLD AUTO: 245 K/UL (ref 150–400)
PMV BLD AUTO: 10.6 FL (ref 8.9–12.9)
POTASSIUM SERPL-SCNC: 4.8 MMOL/L (ref 3.5–5.1)
PROT SERPL-MCNC: 6.8 G/DL (ref 6.4–8.2)
Q-T INTERVAL, ECG07: 412 MS
QRS DURATION, ECG06: 82 MS
QTC CALCULATION (BEZET), ECG08: 460 MS
RBC # BLD AUTO: 3.79 M/UL (ref 3.8–5.2)
SODIUM SERPL-SCNC: 142 MMOL/L (ref 136–145)
TROPONIN I SERPL-MCNC: <0.05 NG/ML
VENTRICULAR RATE, ECG03: 75 BPM
WBC # BLD AUTO: 4.4 K/UL (ref 3.6–11)

## 2019-03-07 PROCEDURE — 80053 COMPREHEN METABOLIC PANEL: CPT

## 2019-03-07 PROCEDURE — 36415 COLL VENOUS BLD VENIPUNCTURE: CPT

## 2019-03-07 PROCEDURE — 93005 ELECTROCARDIOGRAM TRACING: CPT

## 2019-03-07 PROCEDURE — 85025 COMPLETE CBC W/AUTO DIFF WBC: CPT

## 2019-03-07 PROCEDURE — 74011250636 HC RX REV CODE- 250/636: Performed by: EMERGENCY MEDICINE

## 2019-03-07 PROCEDURE — 96374 THER/PROPH/DIAG INJ IV PUSH: CPT

## 2019-03-07 PROCEDURE — 84484 ASSAY OF TROPONIN QUANT: CPT

## 2019-03-07 PROCEDURE — 99285 EMERGENCY DEPT VISIT HI MDM: CPT

## 2019-03-07 RX ORDER — KETOROLAC TROMETHAMINE 30 MG/ML
15 INJECTION, SOLUTION INTRAMUSCULAR; INTRAVENOUS
Status: COMPLETED | OUTPATIENT
Start: 2019-03-07 | End: 2019-03-07

## 2019-03-07 RX ADMIN — KETOROLAC TROMETHAMINE 15 MG: 30 INJECTION, SOLUTION INTRAMUSCULAR; INTRAVENOUS at 12:18

## 2019-03-07 NOTE — DISCHARGE INSTRUCTIONS
Ice to your breast. Rest     Breast Pain: Care Instructions  Your Care Instructions    Breast tenderness and pain may come and go with your monthly periods (cyclic), or it may not follow any pattern (noncyclic). Breast pain is rarely caused by a serious health problem. You may need tests to find the cause. Follow-up care is a key part of your treatment and safety. Be sure to make and go to all appointments, and call your doctor if you are having problems. It's also a good idea to know your test results and keep a list of the medicines you take. How can you care for yourself at home? · If your doctor gave you medicine, take it exactly as prescribed. Call your doctor if you think you are having a problem with your medicine. · Take an over-the-counter pain medicine, such as acetaminophen (Tylenol), ibuprofen (Advil, Motrin), or naproxen (Aleve), to relieve pain and swelling. Read and follow all instructions on the label. · Do not take two or more pain medicines at the same time unless the doctor told you to. Many pain medicines have acetaminophen, which is Tylenol. Too much acetaminophen (Tylenol) can be harmful. · Wear a supportive bra, such as a sports bra or a jog bra. · Cut down on the amount of fat in your diet. If you need help planning healthy meals, see a dietitian. · Get at least 30 minutes of exercise on most days of the week. Walking is a good choice. You also may want to do other activities, such as running, swimming, cycling, or playing tennis or team sports. · Keep a healthy sleep pattern. Go to bed at the same time every night, and get up at the same time every day. When should you call for help? Call your doctor now or seek immediate medical care if:    · You have new changes in a breast, such as:  ? A lump or thickening in your breast or armpit. ? A change in the breast's size or shape. ? Skin changes, such as dimples or puckers. ? Nipple discharge.   ? A change in the color or feel of the skin of your breast or the darker area around the nipple (areola). ? A change in the shape of the nipple (it may look like it's being pulled into the breast).     · You have symptoms of a breast infection, such as:  ? Increased pain, swelling, redness, or warmth around a breast.  ? Red streaks extending from the breast.  ? Pus draining from a breast.  ? A fever.    Watch closely for changes in your health, and be sure to contact your doctor if:    · Your breast pain does not get better after 1 week.     · You have a lump or thickening in your breast or armpit. Where can you learn more? Go to http://marifer-alonzo.info/. Enter A788 in the search box to learn more about \"Breast Pain: Care Instructions. \"  Current as of: September 23, 2018  Content Version: 11.9  © 0076-3038 [employer] Care instructions adapted under license by [employer] (which disclaims liability or warranty for this information). If you have questions about a medical condition or this instruction, always ask your healthcare professional. [address] any warranty or liability for your use of this information.

## 2019-03-07 NOTE — ED PROVIDER NOTES
I have evaluated the patient as the Provider in Triage. I have reviewed Her vital signs and the triage nurse assessment. I have talked with the patient and any available family and advised that I am the provider in triage and have ordered the appropriate study to initiate their work up based on the clinical presentation during my assessment. I have advised that the patient will be accommodated in the Main ED as soon as possible. I have also requested to contact the triage nurse or myself immediately if the patient experiences any changes in their condition during this brief waiting period. Note written by Liana Kwong, as dictated by Ana Laura Godinez MD 11:31 AM    12:01 PM   38 y.o. female with past medical history significant for Rheumatoid arthritis who presents from home with chief complaint of breast pain. Patient endorses undergoing breast augmentation on 02/26/2019 in [place] with no immediate complications. Patient states four days ago she began to have right sided breast pain described as \"sore\" that has worsened in severity since onset. Patient presents to [facility] ED today with persisting, worsening right upper breast pain described as a \"pulling sensation\" rated as 9/10 in severity. Patient reports aggravation of right upper breast pain with palpation and positional movement. Patient reports accompanying fatigue. Patient endorses taking Flexeril at night for pain with minimal relief. Patient notes she accidentally \"slipped between the ottoman and sofa\" a few days ago, but states she did not have any immediate pain after the fall. Patient notes she called the plastic surgeon in [place] who performed the surgery but has not yet heard back. Patient reports history of hysterectomy. Patient endorses the use of alcohol and notes drinking the past two nights, but denies tobacco or elicit drug use.  Pt denies nipple discharge or drainage, diaphoresis, fever, chills, cough, congestion, shortness of breath, chest pain, abdominal pain, nausea, vomiting, diarrhea, difficulty with urination or dysuria. There are no other acute medical concerns at this time. PCP: Brandy Smith MD    Note written by Liana Davalos, as dictated by Royal Carl PA-C 12:12 PM        The history is provided by the patient and a parent. No  was used. Past Medical History:   Diagnosis Date    Headache     Rheumatoid arthritis ([address]        Past Surgical History:   Procedure Laterality Date    HX BREAST AUGMENTATION  02/26/2019    HX HYSTERECTOMY  2017         Family History:   Problem Relation Age of Onset    Stroke Mother     Cancer Father        Social History     Socioeconomic History    Marital status: LEGALLY      Spouse name: Not on file    Number of children: Not on file    Years of education: Not on file    Highest education level: Not on file   Social Needs    Financial resource strain: Not on file    Food insecurity - worry: Not on file    Food insecurity - inability: Not on file   [employer] needs - medical: Not on file   [employer] needs - non-medical: Not on file   Occupational History    Not on file   Tobacco Use    Smoking status: Never Smoker    Smokeless tobacco: Never Used   Substance and Sexual Activity    Alcohol use: Yes     Frequency: 2-4 times a month    Drug use: No    Sexual activity: Yes   Other Topics Concern    Not on file   Social History Narrative    Not on file         ALLERGIES: Patient has no known allergies. Review of Systems   Constitutional: Positive for fatigue. Negative for appetite change, chills, diaphoresis and fever. HENT: Negative for congestion, ear pain, postnasal drip, rhinorrhea and sore throat. Eyes: Negative for visual disturbance. Respiratory: Negative for cough, shortness of breath and wheezing. Cardiovascular: Negative for chest pain, palpitations and leg swelling.    Gastrointestinal: Negative for abdominal pain, anal bleeding, constipation, diarrhea, nausea and vomiting. Genitourinary: Negative for difficulty urinating, dysuria and hematuria. Musculoskeletal: Negative for arthralgias, back pain and myalgias. Skin: Negative for color change and rash. +right upper breast pain   Allergic/Immunologic: Negative for immunocompromised state. Neurological: Negative for weakness, light-headedness and headaches. All other systems reviewed and are negative. Patient Vitals for the past 12 hrs:   Temp Pulse Resp BP SpO2   03/07/19 1330  73 17 (!) 140/96 98 %   03/07/19 1300  75 16 (!) 140/98 95 %   03/07/19 1236     99 %   03/07/19 1230  73 17 (!) 154/94 100 %   03/07/19 1229  74 15 (!) 149/96 99 %   03/07/19 1133 98.4 °F (36.9 °C) 83 16 (!) 140/95 100 %              Physical Exam   Constitutional: She is oriented to person, place, and time. She appears well-developed and well-nourished. No distress. HENT:   Head: Normocephalic and atraumatic. Right Ear: External ear normal.   Left Ear: External ear normal.   Eyes: EOM are normal. Pupils are equal, round, and reactive to light. Neck: Neck supple. Cardiovascular: Normal rate, regular rhythm, normal heart sounds and intact distal pulses. Exam reveals no gallop and no friction rub. No murmur heard. Pulmonary/Chest: Effort normal and breath sounds normal. No stridor. No respiratory distress. She has no wheezes. She has no rales. She exhibits no tenderness. Abdominal: Soft. Bowel sounds are normal. She exhibits no distension and no mass. There is no tenderness. There is no rebound and no guarding. Musculoskeletal: Normal range of motion. She exhibits no edema, tenderness or deformity. Neurological: She is alert and oriented to person, place, and time. No cranial nerve deficit. Coordination normal.   Skin: Skin is warm and dry. Capillary refill takes less than 2 seconds. No rash noted. No erythema. No pallor.    Right breast ttp at 12 upper breast with slight increase in swelling and firmness to left breast. No discoloration, lesions,drainage, normothermic. Well healing incisions to lower breasts. Psychiatric: She has a normal mood and affect. Her behavior is normal.   Nursing note and vitals reviewed. MDM  Number of Diagnoses or Management Options     Amount and/or Complexity of Data Reviewed  Clinical lab tests: ordered and reviewed  Tests in the medicine section of CPT®: reviewed and ordered  Obtain history from someone other than the patient: yes (mother)  Review and summarize past medical records: yes  Independent visualization of images, tracings, or specimens: yes    Patient Progress  Patient progress: stable         Procedures    12:01 PM  Discussed pt, sx, hx and current findings with Domi Cole MD. She is in agreement with plan. Will check labs, ekg and continue to monitor. Will medicate for discomfort  Fabiola Evans. SHERINE Campoverde      ED EKG interpretation:11:56 AM  Rhythm: normal sinus rhythm; and regular . Rate (approx.): 75; Axis: normal; P wave: normal; QRS interval: normal ; ST/T wave: normal; Other findings: normal. This EKG was interpreted by Domi Cole MD,ED Provider. 11:55 AM  Domi Cole MD spoke with Dr. Rich Lemus, Consult for Radiology. Discussed available diagnostic tests and clinical findings. He is in agreement with care plans as outlined. He recommends sending pt to breast specialist for breast US. No imaging in ER through the ER unless concern for PE.   Susie Campoverde PA-C    1:28 PM  Fabiola Evans. SHERINE Campoverde' spoke with Dr. Jamar Vallecillo, Consult for breast surgery. Discussed available diagnostic tests and clinical findings. She is in agreement with care plans as outlined. She will see pt in the office today for US of her breast.   Fabiola Evans. SHERINE Campoverde    1:28 PM   no evidence of infection. No PE story/ exam. Possible muscle post op pain, concern for capsule/ breast implant. Will discharge pt to see breast surgeon for [address] or breast. Care plan reviewed with pt . Return precautions given  Lovey Kanner. SHERINE Campoverde      LABORATORY TESTS:  Recent Results (from the past 12 hour(s))   CBC WITH AUTOMATED DIFF    Collection Time: 03/07/19 11:45 AM   Result Value Ref Range    WBC 4.4 3.6 - 11.0 K/uL    RBC 3.79 (L) 3.80 - 5.20 M/uL    HGB 12.7 11.5 - 16.0 g/dL    HCT 37.9 35.0 - 47.0 %    .0 (H) 80.0 - 99.0 FL    MCH 33.5 26.0 - 34.0 PG    MCHC 33.5 30.0 - 36.5 g/dL    RDW 12.0 11.5 - 14.5 %    PLATELET 671 320 - 726 K/uL    MPV 10.6 8.9 - 12.9 FL    NRBC 0.0 0  WBC    ABSOLUTE NRBC 0.00 0.00 - 0.01 K/uL    NEUTROPHILS 53 32 - 75 %    LYMPHOCYTES 35 12 - 49 %    MONOCYTES 8 5 - 13 %    EOSINOPHILS 3 0 - 7 %    BASOPHILS 1 0 - 1 %    IMMATURE GRANULOCYTES 0 0.0 - 0.5 %    ABS. NEUTROPHILS 2.3 1.8 - 8.0 K/UL    ABS. LYMPHOCYTES 1.5 0.8 - 3.5 K/UL    ABS. MONOCYTES 0.4 0.0 - 1.0 K/UL    ABS. EOSINOPHILS 0.1 0.0 - 0.4 K/UL    ABS. BASOPHILS 0.1 0.0 - 0.1 K/UL    ABS. IMM. GRANS. 0.0 0.00 - 0.04 K/UL    DF AUTOMATED     METABOLIC PANEL, COMPREHENSIVE    Collection Time: 03/07/19 11:45 AM   Result Value Ref Range    Sodium 142 136 - 145 mmol/L    Potassium 4.8 3.5 - 5.1 mmol/L    Chloride 109 (H) 97 - 108 mmol/L    CO2 25 21 - 32 mmol/L    Anion gap 8 5 - 15 mmol/L    Glucose 81 65 - 100 mg/dL    BUN 11 6 - 20 MG/DL    Creatinine 0.64 0.55 - 1.02 MG/DL    BUN/Creatinine ratio 17 12 - 20      GFR est AA >60 >60 ml/min/1.73m2    GFR est non-AA >60 >60 ml/min/1.73m2    Calcium 8.6 8.5 - 10.1 MG/DL    Bilirubin, total 0.3 0.2 - 1.0 MG/DL    ALT (SGPT) 24 12 - 78 U/L    AST (SGOT) 22 15 - 37 U/L    Alk.  phosphatase 55 45 - 117 U/L    Protein, total 6.8 6.4 - 8.2 g/dL    Albumin 3.4 (L) 3.5 - 5.0 g/dL    Globulin 3.4 2.0 - 4.0 g/dL    A-G Ratio 1.0 (L) 1.1 - 2.2     TROPONIN I    Collection Time: 03/07/19 11:45 AM   Result Value Ref Range    Troponin-I, Qt. <0.05 <0.05 ng/mL   EKG, 12 LEAD, INITIAL Collection Time: 03/07/19 11:56 AM   Result Value Ref Range    Ventricular Rate 75 BPM    Atrial Rate 75 BPM    P-R Interval 124 ms    QRS Duration 82 ms    Q-T Interval 412 ms    QTC Calculation (Bezet) 460 ms    Calculated P Axis 38 degrees    Calculated R Axis 7 degrees    Calculated T Axis 49 degrees    Diagnosis       Normal sinus rhythm  Normal ECG  When compared with ECG of 24-MAY-2010 10:05,  No significant change was found         IMAGING RESULTS:    No results found. MEDICATIONS GIVEN:  Medications   ketorolac (TORADOL) injection 15 mg (15 mg IntraVENous Given 3/7/19 1218)       IMPRESSION:  1. Breast pain in female    2. S/P breast augmentation        PLAN:  1. Discharge Medication List as of 3/7/2019  1:39 PM      CONTINUE these medications which have NOT CHANGED    Details   gabapentin (NEURONTIN) 100 mg capsule Take 3 Caps by mouth nightly., NormalA follow up appointment is required for further refillsDisp-90 Cap, R-0      pregabalin (LYRICA) 50 mg capsule Take 1 Cap by mouth two (2) times a day. Max Daily Amount: 100 mg., Print, Disp-60 Cap, R-3      topiramate ER (TROKENDI XR) 100 mg capsule Take 1 Cap by mouth daily. , Sample, Disp-14 Cap, R-0      tiZANidine (ZANAFLEX) 4 mg tablet TAKE 1 TABLET AT BEDTIME (ALTERNATE WITH CYCLOBENZAPRINE), Historical Med, R-1      clonazePAM (KLONOPIN) 0.5 mg tablet TK 1 TO 2 T PO QHS UTD, Historical Med, R-3      PARoxetine (PAXIL) 30 mg tablet TAKE 1 TABLET BY MOUTH EVERY MORNING, Historical Med, R-3      topiramate (TOPAMAX) 50 mg tablet TAKE 1-2 TABLET TWICE A DAY, Historical Med, R-6      rizatriptan (MAXALT) 10 mg tablet TAKE 1 TABLET BY MOUTH AS NEEDED FOR MIGRAINE, MAY REPEAT DOSE ONCE WITHIN 24 HOURS IF NEEDED, Historical Med, R-6      ondansetron hcl (ZOFRAN) 8 mg tablet TAKE 1 TABLET BY MOUTH EVERY 8 HOURS AS NEEDED FOR NAUSEA, Historical Med, R-3      methotrexate (RHEUMATREX) 2.5 mg tablet TAKE 10 TABLETS BY MOUTH ONCE PER WEEK, Historical Med, R-0      eletriptan (RELPAX) 40 mg tablet TAKE 1 TABLET BY MOUTH AT THE ONSET OF HEADACHE, MAY REPEAT WITH 1 TAB IN 2 HOURS IF NEEDED, Historical Med, R-3           2. Follow-up Information     Follow up With Specialties Details Why Contact Info    Mert Hagen MD Breast Surgery, Breast Surgery Go to go staight to the office at [facility] to be worked in for an outpatient ultrasound after discharge [address]  [phone]          Return to ED if worse       1:28 PM  Pt has been reexamined. Pt has no new complaints, changes or physical findings. Care plan outlined and precautions discussed. All available results were reviewed with pt. All medications were reviewed with pt. All of pt's questions and concerns were addressed. Pt agrees to F/U as instructed and agrees to return to ED upon further deterioration. Pt is ready to go home.   VENESSA Medrano

## 2019-03-07 NOTE — PATIENT INSTRUCTIONS
Breast Pain: Care Instructions  Your Care Instructions    Breast tenderness and pain may come and go with your monthly periods (cyclic), or it may not follow any pattern (noncyclic). Breast pain is rarely caused by a serious health problem. You may need tests to find the cause. Follow-up care is a key part of your treatment and safety. Be sure to make and go to all appointments, and call your doctor if you are having problems. It's also a good idea to know your test results and keep a list of the medicines you take. How can you care for yourself at home? · If your doctor gave you medicine, take it exactly as prescribed. Call your doctor if you think you are having a problem with your medicine. · Take an over-the-counter pain medicine, such as acetaminophen (Tylenol), ibuprofen (Advil, Motrin), or naproxen (Aleve), to relieve pain and swelling. Read and follow all instructions on the label. · Do not take two or more pain medicines at the same time unless the doctor told you to. Many pain medicines have acetaminophen, which is Tylenol. Too much acetaminophen (Tylenol) can be harmful. · Wear a supportive bra, such as a sports bra or a jog bra. · Cut down on the amount of fat in your diet. If you need help planning healthy meals, see a dietitian. · Get at least 30 minutes of exercise on most days of the week. Walking is a good choice. You also may want to do other activities, such as running, swimming, cycling, or playing tennis or team sports. · Keep a healthy sleep pattern. Go to bed at the same time every night, and get up at the same time every day. When should you call for help? Call your doctor now or seek immediate medical care if:    · You have new changes in a breast, such as:  ? A lump or thickening in your breast or armpit. ? A change in the breast's size or shape. ? Skin changes, such as dimples or puckers. ? Nipple discharge.   ? A change in the color or feel of the skin of your breast or the darker area around the nipple (areola). ? A change in the shape of the nipple (it may look like it's being pulled into the breast).     · You have symptoms of a breast infection, such as:  ? Increased pain, swelling, redness, or warmth around a breast.  ? Red streaks extending from the breast.  ? Pus draining from a breast.  ? A fever.    Watch closely for changes in your health, and be sure to contact your doctor if:    · Your breast pain does not get better after 1 week.     · You have a lump or thickening in your breast or armpit. Where can you learn more? Go to http://marifer-alonzo.info/. Enter T854 in the search box to learn more about \"Breast Pain: Care Instructions. \"  Current as of: September 23, 2018  Content Version: 11.9  © 1389-5401 [employer]. Care instructions adapted under license by [employer] (which disclaims liability or warranty for this information). If you have questions about a medical condition or this instruction, always ask your healthcare professional. [address] any warranty or liability for your use of this information.

## 2019-03-07 NOTE — ED TRIAGE NOTES
Pt c/o of right side pain with \"pulling sensation\" starting on Sunday. Pt had breast aug 2/26 in [place].  Reports small slip several days ago

## 2019-03-07 NOTE — PROGRESS NOTES
HISTORY OF PRESENT ILLNESS  Halle Pickard is a 38 y.o. female. HPI  NEW patient consult referred by Emergency Department for RIGHT breast pain, s/p breast augmentation in [facility] 2/26/2019, POD#9. Has had pain since Sunday when she slipped and had to break her fall. Her RIGHT breast is a little bit more swollen and tight and painful. The pain has worsened over the last few days. 9/10 when she tries to move. Denies redness. Denies family history of breast or ovarian cancer. Mammogram, 2/2019, [facility],     [facility]    Physical Exam   Pulmonary/Chest: Right breast exhibits tenderness (UOQ.  tender with light palpation). Right breast exhibits no inverted nipple, no mass, no nipple discharge and no skin change. Left breast exhibits no inverted nipple, no mass, no nipple discharge, no skin change and no tenderness. Breasts are symmetrical (bilateral submuscular implants). Lymphadenopathy:     She has no cervical adenopathy. She has no axillary adenopathy. Right: No supraclavicular adenopathy present. Left: No supraclavicular adenopathy present. BREAST ULTRASOUND  Indication: Right  breast pain  Technique: The area was scanned using a high-frequency linear-array near-field transducer  Findings: No seroma or hematoma. No abnormal mass, lesion, or shadowing noted. Two tiny cyst < 3mm. Impression: submuscular implants intact. Disposition: No worrisome finding on ultrasound  ASSESSMENT and PLAN    ICD-10-CM ICD-9-CM    1. Mastodynia of right breast N64.4 611.71      RIGHT breast pain c/w muscle strain after a fall.     Pt will use heat and motrin for pain  PRN follow up

## 2019-10-28 ENCOUNTER — HOSPITAL ENCOUNTER (EMERGENCY)
Age: 44
Discharge: HOME | End: 2019-10-28
Attending: STUDENT IN AN ORGANIZED HEALTH CARE EDUCATION/TRAINING PROGRAM
Payer: COMMERCIAL

## 2019-10-28 ENCOUNTER — APPOINTMENT (OUTPATIENT)
Dept: CT IMAGING | Age: 44
End: 2019-10-28
Attending: EMERGENCY MEDICINE
Payer: COMMERCIAL

## 2019-10-28 VITALS
HEART RATE: 77 BPM | DIASTOLIC BLOOD PRESSURE: 94 MMHG | BODY MASS INDEX: 21.34 KG/M2 | OXYGEN SATURATION: 99 % | TEMPERATURE: 98.9 F | RESPIRATION RATE: 18 BRPM | SYSTOLIC BLOOD PRESSURE: 142 MMHG | HEIGHT: 64 IN | WEIGHT: 125 LBS
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
## 2019-10-28 DIAGNOSIS — S00.03XA SCALP HEMATOMA, INITIAL ENCOUNTER: ICD-10-CM

## 2019-10-28 PROCEDURE — 70450 CT HEAD/BRAIN W/O DYE: CPT

## 2019-10-28 PROCEDURE — 99282 EMERGENCY DEPT VISIT SF MDM: CPT

## 2019-10-28 RX ORDER — BUTALBITAL, ACETAMINOPHEN AND CAFFEINE 300; 40; 50 MG/1; MG/1; MG/1
1 CAPSULE ORAL
Qty: 10 CAP | Refills: 0 | Status: SHIPPED | OUTPATIENT
Start: 2019-10-28

## 2019-10-28 NOTE — ED TRIAGE NOTES
Patient arrives with c/o GLF on Saturday, denies LOC. Since has had distal head pain, headache, neck pain, dizziness, and nausea that has continued. Denies previous head injury. Patient drove herself to ED. Gait is steady.

## 2019-10-28 NOTE — ED PROVIDER NOTES
38 y.o. female with past medical history significant for RA who presents from private vehicle with chief complaint of fall. Pt reports a ground level fall on Saturday, 10/26/19. Pt states she was on a balcony when she turned around and was startled by her friend, which caused her to fall. Pt notes hitting her posterior head during the fall. Pt c/o headache, accompanied by neck pain, lower back pain, nausea, and dizziness since the fall. Pt reports 1 episode of vomiting. Pt denies LOC, hematuria, or previous head injury. There are no other acute medical concerns at this time. PCP: Dannie Machado MD    Note written by Liana Wells, as dictated by Danna Cockayne, NP 3:57 PM            The history is provided by the patient. No  was used.         Past Medical History:   Diagnosis Date    Headache     Rheumatoid arthritis ([facility] [address]        Past Surgical History:   Procedure Laterality Date    HX BREAST AUGMENTATION  02/26/2019    HX HYSTERECTOMY  2017         Family History:   Problem Relation Age of Onset    Stroke Mother     Cancer Father        Social History     Socioeconomic History    Marital status: LEGALLY      Spouse name: Not on file    Number of children: Not on file    Years of education: Not on file    Highest education level: Not on file   Occupational History    Not on file   Social Needs    Financial resource strain: Not on file    Food insecurity:     Worry: Not on file     Inability: Not on file    Transportation needs:     Medical: Not on file     Non-medical: Not on file   Tobacco Use    Smoking status: Never Smoker    Smokeless tobacco: Never Used   Substance and Sexual Activity    Alcohol use: Yes     Frequency: 2-4 times a month    Drug use: No    Sexual activity: Yes   Lifestyle    Physical activity:     Days per week: Not on file     Minutes per session: Not on file    Stress: Not on file   Relationships    Social connections:     Talks on phone: Not on file     Gets together: Not on file     Attends Taoist service: Not on file     Active member of club or organization: Not on file     Attends meetings of clubs or organizations: Not on file     Relationship status: Not on file    Intimate partner violence:     Fear of current or ex partner: Not on file     Emotionally abused: Not on file     Physically abused: Not on file     Forced sexual activity: Not on file   Other Topics Concern    Not on file   Social History Narrative    Not on file         ALLERGIES: Patient has no known allergies. Review of Systems   Constitutional: Negative for activity change, appetite change, fever and unexpected weight change. HENT: Negative for congestion, ear pain, rhinorrhea and trouble swallowing. Eyes: Negative for visual disturbance. Respiratory: Negative for cough and shortness of breath. Cardiovascular: Negative for chest pain and leg swelling. Gastrointestinal: Positive for nausea. Negative for abdominal pain. Genitourinary: Negative for dysuria and flank pain. Musculoskeletal: Negative for back pain. Neurological: Positive for headaches. Negative for dizziness and light-headedness. All other systems reviewed and are negative. There were no vitals filed for this visit. Physical Exam   Constitutional: She is oriented to person, place, and time. She appears well-developed and well-nourished. White female; non smoker   HENT:   Right Ear: External ear normal.   Left Ear: External ear normal.   Nose: Nose normal.   Mouth/Throat: Oropharynx is clear and moist.   Posterior scalp hematoma; Skin integrity is intact. There is no obvious bony deformity. Good neurovascular sensation. No apparent tendon or nerve injury. Neck: Normal range of motion. Neck supple. Reports posterior neck soreness wth active ROM; Skin integrity is intact. There is no obvious bony deformity. Good neurovascular sensation.  No apparent tendon or nerve injury. Cardiovascular: Normal rate and regular rhythm. Pulmonary/Chest: Effort normal and breath sounds normal.   Abdominal: Soft. Bowel sounds are normal.   Musculoskeletal: Normal range of motion. Reports generalized body aches with active ROM; Skin integrity is intact. There is no obvious deformity. Good neurovascular sensation. No apparent tendon or nerve injury. Denies fever, abdominal pain or urinary symptoms. Pain increases with bending, lifting and position changes. Gait is steady; reflexes are normal; pt drove herself here. Neurological: She is alert and oriented to person, place, and time. Skin: Skin is warm and dry. Psychiatric: She has a normal mood and affect. Her behavior is normal.   Nursing note and vitals reviewed. MDM       Procedures    Discussed plan of care with Khurram Park PA-C; she will assume care and disposition.  Jia Castro NP 4:45 PM

## 2025-04-06 ENCOUNTER — HOSPITAL ENCOUNTER (EMERGENCY)
Facility: HOSPITAL | Age: 50
Discharge: HOME | End: 2025-04-06
Attending: EMERGENCY MEDICINE
Payer: COMMERCIAL

## 2025-04-06 ENCOUNTER — APPOINTMENT (OUTPATIENT)
Facility: HOSPITAL | Age: 50
End: 2025-04-06
Payer: COMMERCIAL

## 2025-04-06 VITALS
SYSTOLIC BLOOD PRESSURE: 160 MMHG | RESPIRATION RATE: 14 BRPM | TEMPERATURE: 98.3 F | HEIGHT: 63 IN | HEART RATE: 80 BPM | WEIGHT: 145 LBS | BODY MASS INDEX: 25.69 KG/M2 | DIASTOLIC BLOOD PRESSURE: 99 MMHG | OXYGEN SATURATION: 100 %

## 2025-04-06 DIAGNOSIS — S63.502A WRIST SPRAIN, LEFT, INITIAL ENCOUNTER: Primary | ICD-10-CM

## 2025-04-06 PROCEDURE — 73110 X-RAY EXAM OF WRIST: CPT

## 2025-04-06 PROCEDURE — 73130 X-RAY EXAM OF HAND: CPT

## 2025-04-06 PROCEDURE — 99283 EMERGENCY DEPT VISIT LOW MDM: CPT

## 2025-04-06 ASSESSMENT — LIFESTYLE VARIABLES
HOW OFTEN DO YOU HAVE A DRINK CONTAINING ALCOHOL: 2-4 TIMES A MONTH
HOW MANY STANDARD DRINKS CONTAINING ALCOHOL DO YOU HAVE ON A TYPICAL DAY: 1 OR 2

## 2025-04-06 NOTE — ED PROVIDER NOTES
reasons why they would want to return to the ED prior to their follow-up appointment, should their condition change.    Written by Konstantin Norton MD        Critical Care Time:   0    Disposition:  Discharge    PLAN:  1.      Medication List      You have not been prescribed any medications.       2. @[facility]@  Return to ED if worse     Diagnosis     Clinical Impression:   1. Wrist sprain, left, initial encounter              Please note that this dictation was completed with [employer], the computer voice recognition software.  Quite often unanticipated grammatical, syntax, homophones, and other interpretive errors are inadvertently transcribed by the computer software.  Please disregard these errors.  Please excuse any errors that have escaped final proofreading.       Konstantin Norton MD  04/06/25 [id number]

## 2025-04-07 RX ORDER — OXYCODONE HYDROCHLORIDE 5 MG/1
5 TABLET ORAL EVERY 6 HOURS PRN
Qty: 10 TABLET | Refills: 0 | Status: SHIPPED | OUTPATIENT
Start: 2025-04-07 | End: 2025-04-10

## 2025-04-07 RX ORDER — OXYCODONE HYDROCHLORIDE 5 MG/1
5 TABLET ORAL EVERY 6 HOURS PRN
Qty: 10 TABLET | Refills: 0 | Status: SHIPPED | OUTPATIENT
Start: 2025-04-07 | End: 2025-04-07

## 2025-04-07 NOTE — ED NOTES
Patient called today 4/7/2025 1000 asking for pain medication to be ordered.  Called forwarded to me the Nursing Supervisor.  ER provider aware and will review chart.  Will update patient by phone

## 2025-04-07 NOTE — ED NOTES
Patient called to the emergency department given ongoing pain after her evaluation yesterday.  She reports he has been using ibuprofen as well as Tylenol and icing and wearing her splint although with ongoing pain.  Patient will be given a short prescription for oxycodone for pain management.  Patient reports no allergies.     Trery Seaman MD  04/07/25 [id number]